# Patient Record
Sex: FEMALE | Race: WHITE | URBAN - METROPOLITAN AREA
[De-identification: names, ages, dates, MRNs, and addresses within clinical notes are randomized per-mention and may not be internally consistent; named-entity substitution may affect disease eponyms.]

---

## 2019-04-03 ENCOUNTER — Encounter (OUTPATIENT)
Dept: URBAN - METROPOLITAN AREA CLINIC 71 | Facility: CLINIC | Age: 74
End: 2019-04-03
Payer: MEDICARE

## 2019-04-03 PROCEDURE — 92014 COMPRE OPH EXAM EST PT 1/>: CPT | Performed by: OPHTHALMOLOGY

## 2019-04-03 PROCEDURE — 92134 CPTRZ OPH DX IMG PST SGM RTA: CPT | Performed by: OPHTHALMOLOGY

## 2019-04-25 ENCOUNTER — Encounter (OUTPATIENT)
Dept: URBAN - METROPOLITAN AREA CLINIC 71 | Facility: CLINIC | Age: 74
End: 2019-04-25
Payer: MEDICARE

## 2019-04-25 PROCEDURE — 92012 INTRM OPH EXAM EST PATIENT: CPT | Performed by: OPHTHALMOLOGY

## 2019-05-02 ENCOUNTER — SURGERY (OUTPATIENT)
Dept: URBAN - METROPOLITAN AREA SURGERY 44 | Facility: SURGERY | Age: 74
End: 2019-05-02
Payer: MEDICARE

## 2019-05-02 PROCEDURE — 66821 AFTER CATARACT LASER SURGERY: CPT | Performed by: OPHTHALMOLOGY

## 2019-05-09 ENCOUNTER — Encounter (OUTPATIENT)
Dept: URBAN - METROPOLITAN AREA CLINIC 71 | Facility: CLINIC | Age: 74
End: 2019-05-09

## 2019-05-09 PROCEDURE — 99024 POSTOP FOLLOW-UP VISIT: CPT | Performed by: OPTOMETRIST

## 2019-10-08 ENCOUNTER — Encounter (OUTPATIENT)
Dept: URBAN - METROPOLITAN AREA CLINIC 75 | Facility: CLINIC | Age: 74
End: 2019-10-08
Payer: COMMERCIAL

## 2019-10-08 PROCEDURE — 92014 COMPRE OPH EXAM EST PT 1/>: CPT | Performed by: OPTOMETRIST

## 2019-11-20 ENCOUNTER — Encounter (OUTPATIENT)
Dept: URBAN - METROPOLITAN AREA CLINIC 71 | Facility: CLINIC | Age: 74
End: 2019-11-20
Payer: MEDICARE

## 2019-11-20 PROCEDURE — 92014 COMPRE OPH EXAM EST PT 1/>: CPT | Performed by: OPHTHALMOLOGY

## 2019-11-20 PROCEDURE — 92134 CPTRZ OPH DX IMG PST SGM RTA: CPT | Performed by: OPHTHALMOLOGY

## 2020-05-21 ENCOUNTER — OFFICE VISIT (OUTPATIENT)
Dept: URBAN - METROPOLITAN AREA CLINIC 71 | Facility: CLINIC | Age: 75
End: 2020-05-21
Payer: MEDICARE

## 2020-05-21 DIAGNOSIS — Z96.1 PRESENCE OF INTRAOCULAR LENS: ICD-10-CM

## 2020-05-21 DIAGNOSIS — H35.362 DRUSEN (DEGENERATIVE) OF MACULA, LEFT EYE: Primary | ICD-10-CM

## 2020-05-21 PROCEDURE — 92014 COMPRE OPH EXAM EST PT 1/>: CPT | Performed by: OPHTHALMOLOGY

## 2020-05-21 PROCEDURE — 92134 CPTRZ OPH DX IMG PST SGM RTA: CPT | Performed by: OPHTHALMOLOGY

## 2020-05-21 ASSESSMENT — INTRAOCULAR PRESSURE
OS: 18
OD: 17

## 2020-05-21 NOTE — IMPRESSION/PLAN
Impression: Drusen (degenerative) of macula, left eye: H35.362. Plan: There are no specific modalities proven to be effective to treat drusen. A diet rich in green vegetables is recommended. It is important to follow the amsler grid and report any changes such as increase in distortion or blurring of lines. The drusen may increase with time and to try to decrease progression the recommendations above should be followed. If drusen progress they can cause decrease in vision or distortion of vision. Drusen may be an early sign of macular degeneration and this diagnosis may be possible in future. Avoidance of smoking or smoking cessation is recommended. Contact office if you experience decrease in vision, blurred vision, distortion in vision or concerning changes in vision. TAKE EYE VITAMINS AND CHECK AMSLER GRID DAILY.

## 2020-11-04 ENCOUNTER — OFFICE VISIT (OUTPATIENT)
Dept: URBAN - METROPOLITAN AREA CLINIC 75 | Facility: CLINIC | Age: 75
End: 2020-11-04
Payer: COMMERCIAL

## 2020-11-04 PROCEDURE — 92014 COMPRE OPH EXAM EST PT 1/>: CPT | Performed by: OPTOMETRIST

## 2020-11-04 ASSESSMENT — KERATOMETRY
OD: 44.00
OS: 43.88

## 2020-11-04 ASSESSMENT — INTRAOCULAR PRESSURE
OS: 14
OD: 13

## 2020-11-04 ASSESSMENT — VISUAL ACUITY
OS: 20/20
OD: 20/25

## 2020-11-04 NOTE — IMPRESSION/PLAN
Impression: Drusen (degenerative) of macula, left eye: H35.362 - hx of. appears stable.  Plan: Keep appts w/ Dr. Reginald Ramos to follow drusen OS

## 2021-05-19 ENCOUNTER — OFFICE VISIT (OUTPATIENT)
Dept: URBAN - METROPOLITAN AREA CLINIC 71 | Facility: CLINIC | Age: 76
End: 2021-05-19
Payer: MEDICARE

## 2021-05-19 DIAGNOSIS — H43.813 VITREOUS DEGENERATION, BILATERAL: ICD-10-CM

## 2021-05-19 DIAGNOSIS — H35.363 DRUSEN (DEGENERATIVE) OF MACULA, BILATERAL: ICD-10-CM

## 2021-05-19 PROCEDURE — 92014 COMPRE OPH EXAM EST PT 1/>: CPT | Performed by: OPHTHALMOLOGY

## 2021-05-19 PROCEDURE — 92134 CPTRZ OPH DX IMG PST SGM RTA: CPT | Performed by: OPHTHALMOLOGY

## 2021-05-19 ASSESSMENT — INTRAOCULAR PRESSURE
OD: 19
OS: 20
OS: 24
OD: 17

## 2021-05-19 NOTE — IMPRESSION/PLAN
Impression: Drusen (degenerative) of macula, bilateral: H35.363. Plan: dry type with stable vision. Will continue to monitor vision and the patient has been instructed to call with any vision changes. Recommend continuing the eye vitamins.

## 2021-05-19 NOTE — IMPRESSION/PLAN
Impression: Other secondary cataract, right eye: H26.491. Plan: PCO account for the patient's complaints. No treatment currently recommended. The patient will monitor vision changes and contact us with any decrease in vision.

## 2021-09-23 ENCOUNTER — OFFICE VISIT (OUTPATIENT)
Dept: URBAN - METROPOLITAN AREA CLINIC 71 | Facility: CLINIC | Age: 76
End: 2021-09-23
Payer: MEDICARE

## 2021-09-23 DIAGNOSIS — H26.491 OTHER SECONDARY CATARACT, RIGHT EYE: Primary | ICD-10-CM

## 2021-09-23 DIAGNOSIS — H40.053 OCULAR HYPERTENSION, BILATERAL: ICD-10-CM

## 2021-09-23 PROCEDURE — 99214 OFFICE O/P EST MOD 30 MIN: CPT | Performed by: OPHTHALMOLOGY

## 2021-09-23 ASSESSMENT — VISUAL ACUITY: OD: 20/20

## 2021-09-23 ASSESSMENT — INTRAOCULAR PRESSURE
OD: 23
OS: 23

## 2021-09-23 NOTE — IMPRESSION/PLAN
Impression: Ocular hypertension, bilateral: H40.053. High end of normal. Discussed adding drops for more of preventive. explained the drops vs the laser. Will start an eye drop if the IOP starts to peak over 25 Plan: Ocular hypertension, intraocular pressure too high, will start medication(s). Prescription(s) given to patient. Diagnosis explained and patient verbalized understanding.

## 2021-09-23 NOTE — IMPRESSION/PLAN
Impression: Other secondary cataract, right eye: H26.491. Plan: PCO account for the patient's complaints. Discussed all risks, benefits, procedures and recovery. Patient understands changing glasses will not improve vision. Patient desires to have surgery, order for surgery OD only.

## 2021-11-04 ENCOUNTER — PRE-OPERATIVE VISIT (OUTPATIENT)
Dept: URBAN - METROPOLITAN AREA CLINIC 71 | Facility: CLINIC | Age: 76
End: 2021-11-04
Payer: MEDICARE

## 2021-11-04 PROCEDURE — 92012 INTRM OPH EXAM EST PATIENT: CPT | Performed by: OPHTHALMOLOGY

## 2021-11-04 ASSESSMENT — VISUAL ACUITY: OD: 20/20

## 2021-11-04 ASSESSMENT — INTRAOCULAR PRESSURE
OD: 17
OS: 17

## 2021-11-04 NOTE — IMPRESSION/PLAN
Impression: Other secondary cataract, right eye: H26.491. Plan: OD: Discussed diagnosis in detail with patient. Discussed treatment options with patient. Discussed risks and benefits and patient understands. Surgical treatment is necessary to improve vision. Patient elects to have surgery. Pre op instructions given and understood. Post op instructions given and understood. Continue using current medication(s). PROCEED WITH YAG LASER CAPSULOTOMY OD ONLY. CS DONE.

## 2021-11-30 ENCOUNTER — SURGERY (OUTPATIENT)
Dept: URBAN - METROPOLITAN AREA SURGERY 44 | Facility: SURGERY | Age: 76
End: 2021-11-30
Payer: MEDICARE

## 2021-11-30 ENCOUNTER — SURGERY (OUTPATIENT)
Dept: URBAN - METROPOLITAN AREA SURGERY 45 | Facility: SURGERY | Age: 76
End: 2021-11-30
Payer: MEDICARE

## 2021-11-30 PROCEDURE — G8907 PT DOC NO EVENTS ON DISCHARG: HCPCS | Performed by: CLINIC/CENTER

## 2021-11-30 PROCEDURE — 66821 AFTER CATARACT LASER SURGERY: CPT | Performed by: OPHTHALMOLOGY

## 2021-11-30 PROCEDURE — G8918 PT W/O PREOP ORDER IV AB PRO: HCPCS | Performed by: CLINIC/CENTER

## 2021-12-08 ENCOUNTER — POST-OPERATIVE VISIT (OUTPATIENT)
Dept: URBAN - METROPOLITAN AREA CLINIC 71 | Facility: CLINIC | Age: 76
End: 2021-12-08
Payer: MEDICARE

## 2021-12-08 DIAGNOSIS — Z48.810 ENCOUNTER FOR SURGICAL AFTERCARE FOLLOWING SURGERY ON A SENSE ORGAN: Primary | ICD-10-CM

## 2021-12-08 PROCEDURE — 99024 POSTOP FOLLOW-UP VISIT: CPT | Performed by: OPHTHALMOLOGY

## 2021-12-08 ASSESSMENT — INTRAOCULAR PRESSURE
OS: 16
OD: 13

## 2021-12-08 NOTE — IMPRESSION/PLAN
Impression: S/P YAG Capsulotomy (Yttrium Aluminum [place]) OD - 8 Days. Encounter for surgical aftercare following surgery on a sense organ  Z48.810. Plan: Open capsule, vision has improved slightly. t stated that she is still experiencing a star burst effect. Discussed updating her glasses and prism.

## 2022-02-01 ENCOUNTER — OFFICE VISIT (OUTPATIENT)
Dept: URBAN - METROPOLITAN AREA CLINIC 75 | Facility: CLINIC | Age: 77
End: 2022-02-01
Payer: COMMERCIAL

## 2022-02-01 DIAGNOSIS — H52.4 PRESBYOPIA: Primary | ICD-10-CM

## 2022-02-01 PROCEDURE — 92014 COMPRE OPH EXAM EST PT 1/>: CPT | Performed by: OPTOMETRIST

## 2022-02-01 ASSESSMENT — INTRAOCULAR PRESSURE
OD: 15
OS: 18

## 2022-02-01 NOTE — IMPRESSION/PLAN
Impression: Drusen (degenerative) of macula, bilateral: H35.363. Clinical exam reveals evidence of mild drusen OU. Not enough to justify dx of AMD at this wes. Plan: Discussed drusen may be an early sign of macular degeneration and this diagnosis may be possible in future. There are no specific modalities proven to be effective to treat drusen. Avoidance of smoking or smoking cessation, a diet rich in green vegetables, and regular use of sunglasses with 100% ultraviolet light is recommended. Observe. Pt to contact office if he experiences decrease in vision, blurred vision, distortion in vision or concerning changes in vision.

## 2022-03-28 ENCOUNTER — OFFICE VISIT (OUTPATIENT)
Dept: URBAN - METROPOLITAN AREA CLINIC 75 | Facility: CLINIC | Age: 77
End: 2022-03-28
Payer: MEDICARE

## 2022-03-28 DIAGNOSIS — H40.013 OPEN ANGLE WITH BORDERLINE FINDINGS, LOW RISK, BILATERAL: ICD-10-CM

## 2022-03-28 PROCEDURE — 99214 OFFICE O/P EST MOD 30 MIN: CPT | Performed by: OPTOMETRIST

## 2022-03-28 PROCEDURE — 92134 CPTRZ OPH DX IMG PST SGM RTA: CPT | Performed by: OPTOMETRIST

## 2022-03-28 ASSESSMENT — INTRAOCULAR PRESSURE
OD: 16
OS: 16

## 2022-03-28 NOTE — IMPRESSION/PLAN
Impression: Open angle with borderline findings, low risk, bilateral: H40.013. Plan: Nasal step noted on Ganglion cell analysis OU today. Discussed possible correlation with glaucoma but due to normal C/D ratio OU and normotensive IOP OU. No treatment needed at this time.  Will continue to monitor

## 2022-03-28 NOTE — IMPRESSION/PLAN
Impression: Drusen (degenerative) of macula, left eye: H35.362. Plan: Clinical exam reveals evidence of mild drusen OU. Not enough to justify dx of AMD at this Time. Discussed drusen may be an early sign of macular degeneration and this diagnosis may be possible in future. There are no specific modalities proven to be effective to treat drusen. Avoidance of smoking or smoking cessation, a diet rich in green vegetables, and regular use of sunglasses with 100% ultraviolet light is recommended. Continue taking the eyes vitamins and Observe. Pt to contact office if he experiences decrease in vision, blurred vision, distortion in vision or concerning changes in vision.

## 2022-06-08 ENCOUNTER — OFFICE VISIT (OUTPATIENT)
Dept: URBAN - METROPOLITAN AREA CLINIC 71 | Facility: CLINIC | Age: 77
End: 2022-06-08
Payer: MEDICARE

## 2022-06-08 DIAGNOSIS — H43.813 VITREOUS DEGENERATION, BILATERAL: ICD-10-CM

## 2022-06-08 DIAGNOSIS — Z96.1 PRESENCE OF INTRAOCULAR LENS: ICD-10-CM

## 2022-06-08 DIAGNOSIS — H35.362 DRUSEN (DEGENERATIVE) OF MACULA, LEFT EYE: Primary | ICD-10-CM

## 2022-06-08 PROCEDURE — 99212 OFFICE O/P EST SF 10 MIN: CPT | Performed by: OPHTHALMOLOGY

## 2022-06-08 PROCEDURE — 92134 CPTRZ OPH DX IMG PST SGM RTA: CPT | Performed by: OPHTHALMOLOGY

## 2022-06-08 ASSESSMENT — INTRAOCULAR PRESSURE
OD: 17
OS: 12

## 2022-06-08 NOTE — IMPRESSION/PLAN
Impression: Drusen (degenerative) of macula, left eye: H35.362. Plan: OCT ordered- no sign of fluid or progression. Recommend continuing an eye vitamins as rx. Patient to return in 6 months for an OCT testing.

## 2022-12-08 ENCOUNTER — OFFICE VISIT (OUTPATIENT)
Dept: URBAN - METROPOLITAN AREA CLINIC 71 | Facility: CLINIC | Age: 77
End: 2022-12-08
Payer: MEDICARE

## 2022-12-08 DIAGNOSIS — H35.362 DRUSEN (DEGENERATIVE) OF MACULA, LEFT EYE: Primary | ICD-10-CM

## 2022-12-08 DIAGNOSIS — Z96.1 PRESENCE OF INTRAOCULAR LENS: ICD-10-CM

## 2022-12-08 DIAGNOSIS — H43.813 VITREOUS DEGENERATION, BILATERAL: ICD-10-CM

## 2022-12-08 DIAGNOSIS — H40.013 OPEN ANGLE WITH BORDERLINE FINDINGS, LOW RISK, BILATERAL: ICD-10-CM

## 2022-12-08 PROCEDURE — 92134 CPTRZ OPH DX IMG PST SGM RTA: CPT | Performed by: OPHTHALMOLOGY

## 2022-12-08 PROCEDURE — 99213 OFFICE O/P EST LOW 20 MIN: CPT | Performed by: OPHTHALMOLOGY

## 2022-12-08 ASSESSMENT — INTRAOCULAR PRESSURE
OD: 19
OD: 22
OS: 21

## 2022-12-08 NOTE — IMPRESSION/PLAN
Impression: Drusen (degenerative) of macula, left eye: H35.362. Plan: OCT ordered- no sign of fluid or progression. Recommend continuing an eye vitamins as rx.

## 2022-12-08 NOTE — IMPRESSION/PLAN
Impression: Open angle with borderline findings, low risk, bilateral: H40.013. Plan: OCT ordered- RNFL normal, IOP stable. Continue off drops at this time. Patient ot return in 6 months for VF testing.

## 2023-06-08 ENCOUNTER — OFFICE VISIT (OUTPATIENT)
Dept: URBAN - METROPOLITAN AREA CLINIC 71 | Facility: CLINIC | Age: 78
End: 2023-06-08
Payer: MEDICARE

## 2023-06-08 DIAGNOSIS — H43.813 VITREOUS DEGENERATION, BILATERAL: ICD-10-CM

## 2023-06-08 DIAGNOSIS — Z96.1 PRESENCE OF INTRAOCULAR LENS: ICD-10-CM

## 2023-06-08 DIAGNOSIS — H35.362 DRUSEN (DEGENERATIVE) OF MACULA, LEFT EYE: ICD-10-CM

## 2023-06-08 DIAGNOSIS — H40.053 OCULAR HYPERTENSION, BILATERAL: Primary | ICD-10-CM

## 2023-06-08 PROCEDURE — 99213 OFFICE O/P EST LOW 20 MIN: CPT | Performed by: OPHTHALMOLOGY

## 2023-06-08 PROCEDURE — 92083 EXTENDED VISUAL FIELD XM: CPT | Performed by: OPHTHALMOLOGY

## 2023-06-08 RX ORDER — LATANOPROST 50 UG/ML
0.005 % SOLUTION OPHTHALMIC
Qty: 2.5 | Refills: 0 | Status: INACTIVE
Start: 2023-06-08 | End: 2023-06-09

## 2023-06-08 ASSESSMENT — INTRAOCULAR PRESSURE
OS: 19
OD: 18

## 2023-06-08 NOTE — IMPRESSION/PLAN
Impression: Ocular hypertension, bilateral: H40.053. Plan: VF ordered, with NSC, IOP stable. Couldn't find previous VF to compared so we will repeat the VF to make sure that the [id number] defects are normal of if they have changed. Patient would like to be preventive, so we will start her on drops. Patient ot return in 1 months for drop check.

## 2023-06-08 NOTE — IMPRESSION/PLAN
Impression: Drusen (degenerative) of macula, left eye: H35.362. Plan: Recommend continuing an eye vitamins as rx.

## 2023-07-10 ENCOUNTER — OFFICE VISIT (OUTPATIENT)
Dept: URBAN - METROPOLITAN AREA CLINIC 71 | Facility: CLINIC | Age: 78
End: 2023-07-10
Payer: MEDICARE

## 2023-07-10 DIAGNOSIS — H43.813 VITREOUS DEGENERATION, BILATERAL: ICD-10-CM

## 2023-07-10 DIAGNOSIS — H40.053 OCULAR HYPERTENSION, BILATERAL: Primary | ICD-10-CM

## 2023-07-10 DIAGNOSIS — H35.362 DRUSEN (DEGENERATIVE) OF MACULA, LEFT EYE: ICD-10-CM

## 2023-07-10 DIAGNOSIS — Z96.1 PRESENCE OF INTRAOCULAR LENS: ICD-10-CM

## 2023-07-10 PROCEDURE — 99212 OFFICE O/P EST SF 10 MIN: CPT | Performed by: OPHTHALMOLOGY

## 2023-07-10 RX ORDER — LATANOPROST 50 UG/ML
0.005 % SOLUTION OPHTHALMIC
Qty: 7.5 | Refills: 2 | Status: ACTIVE
Start: 2023-07-10

## 2023-07-10 ASSESSMENT — INTRAOCULAR PRESSURE
OD: 16
OS: 19

## 2023-07-10 NOTE — IMPRESSION/PLAN
Impression: Ocular hypertension, bilateral: H40.053. Plan: IOP stable. Patient would like to be preventive, so we will start her on drops. Patient to return in 5 months for VF testing.

## 2023-10-12 ENCOUNTER — OFFICE VISIT (OUTPATIENT)
Dept: URBAN - METROPOLITAN AREA CLINIC 71 | Facility: CLINIC | Age: 78
End: 2023-10-12
Payer: MEDICARE

## 2023-10-12 DIAGNOSIS — H43.813 VITREOUS DEGENERATION, BILATERAL: ICD-10-CM

## 2023-10-12 DIAGNOSIS — Z96.1 PRESENCE OF INTRAOCULAR LENS: ICD-10-CM

## 2023-10-12 DIAGNOSIS — H40.053 OCULAR HYPERTENSION, BILATERAL: Primary | ICD-10-CM

## 2023-10-12 DIAGNOSIS — H35.362 DRUSEN (DEGENERATIVE) OF MACULA, LEFT EYE: ICD-10-CM

## 2023-10-12 PROCEDURE — 99213 OFFICE O/P EST LOW 20 MIN: CPT | Performed by: OPHTHALMOLOGY

## 2023-10-12 PROCEDURE — 92083 EXTENDED VISUAL FIELD XM: CPT | Performed by: OPHTHALMOLOGY

## 2023-10-12 ASSESSMENT — INTRAOCULAR PRESSURE
OS: 16
OD: 16

## 2024-05-24 ENCOUNTER — OFFICE VISIT (OUTPATIENT)
Dept: URBAN - METROPOLITAN AREA CLINIC 71 | Facility: CLINIC | Age: 79
End: 2024-05-24
Payer: COMMERCIAL

## 2024-05-24 DIAGNOSIS — Z96.1 PRESENCE OF INTRAOCULAR LENS: ICD-10-CM

## 2024-05-24 DIAGNOSIS — H53.2 DIPLOPIA: ICD-10-CM

## 2024-05-24 DIAGNOSIS — H35.363 DRUSEN (DEGENERATIVE) OF MACULA, BILATERAL: ICD-10-CM

## 2024-05-24 DIAGNOSIS — H52.4 PRESBYOPIA: Primary | ICD-10-CM

## 2024-05-24 PROCEDURE — 92012 INTRM OPH EXAM EST PATIENT: CPT

## 2024-05-24 ASSESSMENT — VISUAL ACUITY
OD: 20/20
OS: 20/20

## 2024-05-24 ASSESSMENT — INTRAOCULAR PRESSURE
OD: 10
OS: 17

## 2024-06-24 ENCOUNTER — OFFICE VISIT (OUTPATIENT)
Dept: URBAN - METROPOLITAN AREA CLINIC 71 | Facility: CLINIC | Age: 79
End: 2024-06-24
Payer: MEDICARE

## 2024-06-24 DIAGNOSIS — Z96.1 PRESENCE OF INTRAOCULAR LENS: ICD-10-CM

## 2024-06-24 DIAGNOSIS — H04.123 DRY EYE SYNDROME OF BILATERAL LACRIMAL GLANDS: Primary | ICD-10-CM

## 2024-06-24 DIAGNOSIS — H35.363 DRUSEN (DEGENERATIVE) OF MACULA, BILATERAL: ICD-10-CM

## 2024-06-24 PROCEDURE — 99214 OFFICE O/P EST MOD 30 MIN: CPT | Performed by: OPHTHALMOLOGY

## 2024-06-24 ASSESSMENT — INTRAOCULAR PRESSURE
OS: 16
OD: 15

## 2024-07-03 ENCOUNTER — OFFICE VISIT (OUTPATIENT)
Dept: URBAN - METROPOLITAN AREA CLINIC 71 | Facility: CLINIC | Age: 79
End: 2024-07-03
Payer: MEDICARE

## 2024-07-03 DIAGNOSIS — H04.123 DRY EYE SYNDROME OF BILATERAL LACRIMAL GLANDS: Primary | ICD-10-CM

## 2024-07-03 DIAGNOSIS — H35.362 DRUSEN (DEGENERATIVE) OF MACULA, LEFT EYE: ICD-10-CM

## 2024-07-03 DIAGNOSIS — H40.053 OCULAR HYPERTENSION, BILATERAL: ICD-10-CM

## 2024-07-03 DIAGNOSIS — Z96.1 PRESENCE OF INTRAOCULAR LENS: ICD-10-CM

## 2024-07-03 PROCEDURE — 99213 OFFICE O/P EST LOW 20 MIN: CPT | Performed by: OPHTHALMOLOGY

## 2024-07-03 RX ORDER — LATANOPROST 50 UG/ML
0.005 % SOLUTION OPHTHALMIC
Qty: 7.5 | Refills: 3 | Status: ACTIVE
Start: 2024-07-03

## 2024-07-03 ASSESSMENT — INTRAOCULAR PRESSURE
OD: 18
OS: 21

## 2024-07-08 ENCOUNTER — OFFICE VISIT (OUTPATIENT)
Dept: URBAN - METROPOLITAN AREA CLINIC 71 | Facility: CLINIC | Age: 79
End: 2024-07-08
Payer: MEDICARE

## 2024-07-08 DIAGNOSIS — H16.143 PUNCTATE KERATITIS, BILATERAL: Primary | ICD-10-CM

## 2024-07-08 PROCEDURE — 99213 OFFICE O/P EST LOW 20 MIN: CPT

## 2024-07-08 ASSESSMENT — INTRAOCULAR PRESSURE
OS: 19
OD: 14

## 2024-07-15 ENCOUNTER — OFFICE VISIT (OUTPATIENT)
Dept: URBAN - METROPOLITAN AREA CLINIC 71 | Facility: CLINIC | Age: 79
End: 2024-07-15
Payer: MEDICARE

## 2024-07-15 DIAGNOSIS — H16.143 PUNCTATE KERATITIS, BILATERAL: Primary | ICD-10-CM

## 2024-07-15 PROCEDURE — 99213 OFFICE O/P EST LOW 20 MIN: CPT

## 2024-07-15 ASSESSMENT — INTRAOCULAR PRESSURE
OS: 20
OD: 18

## 2024-07-23 ENCOUNTER — OFFICE VISIT (OUTPATIENT)
Dept: URBAN - METROPOLITAN AREA CLINIC 71 | Facility: CLINIC | Age: 79
End: 2024-07-23
Payer: MEDICARE

## 2024-07-23 DIAGNOSIS — H16.143 PUNCTATE KERATITIS, BILATERAL: Primary | ICD-10-CM

## 2024-07-23 PROCEDURE — 99213 OFFICE O/P EST LOW 20 MIN: CPT

## 2024-07-23 ASSESSMENT — INTRAOCULAR PRESSURE
OS: 20
OD: 15

## 2024-07-29 ENCOUNTER — OFFICE VISIT (OUTPATIENT)
Dept: URBAN - METROPOLITAN AREA CLINIC 71 | Facility: CLINIC | Age: 79
End: 2024-07-29
Payer: MEDICARE

## 2024-07-29 DIAGNOSIS — Z96.1 PRESENCE OF INTRAOCULAR LENS: ICD-10-CM

## 2024-07-29 DIAGNOSIS — H16.143 PUNCTATE KERATITIS, BILATERAL: Primary | ICD-10-CM

## 2024-07-29 PROCEDURE — 99212 OFFICE O/P EST SF 10 MIN: CPT | Performed by: OPHTHALMOLOGY

## 2024-07-29 ASSESSMENT — INTRAOCULAR PRESSURE
OD: 13
OS: 16

## 2024-10-21 ENCOUNTER — OFFICE VISIT (OUTPATIENT)
Dept: URBAN - METROPOLITAN AREA CLINIC 71 | Facility: CLINIC | Age: 79
End: 2024-10-21
Payer: MEDICARE

## 2024-10-21 DIAGNOSIS — H35.363 DRUSEN (DEGENERATIVE) OF MACULA, BILATERAL: ICD-10-CM

## 2024-10-21 DIAGNOSIS — H04.123 DRY EYE SYNDROME OF BILATERAL LACRIMAL GLANDS: ICD-10-CM

## 2024-10-21 DIAGNOSIS — H40.053 OCULAR HYPERTENSION, BILATERAL: Primary | ICD-10-CM

## 2024-10-21 DIAGNOSIS — H43.813 VITREOUS DEGENERATION, BILATERAL: ICD-10-CM

## 2024-10-21 DIAGNOSIS — Z96.1 PRESENCE OF INTRAOCULAR LENS: ICD-10-CM

## 2024-10-21 PROCEDURE — 92083 EXTENDED VISUAL FIELD XM: CPT | Performed by: OPHTHALMOLOGY

## 2024-10-21 PROCEDURE — 99213 OFFICE O/P EST LOW 20 MIN: CPT | Performed by: OPHTHALMOLOGY

## 2024-10-21 ASSESSMENT — INTRAOCULAR PRESSURE
OS: 15
OS: 17
OD: 15

## 2025-01-27 ENCOUNTER — OFFICE VISIT (OUTPATIENT)
Dept: URBAN - METROPOLITAN AREA CLINIC 71 | Facility: CLINIC | Age: 80
End: 2025-01-27
Payer: MEDICARE

## 2025-01-27 DIAGNOSIS — Z96.1 PRESENCE OF INTRAOCULAR LENS: ICD-10-CM

## 2025-01-27 DIAGNOSIS — H35.363 DRUSEN (DEGENERATIVE) OF MACULA, BILATERAL: ICD-10-CM

## 2025-01-27 DIAGNOSIS — H43.813 VITREOUS DEGENERATION, BILATERAL: ICD-10-CM

## 2025-01-27 DIAGNOSIS — H04.123 DRY EYE SYNDROME OF BILATERAL LACRIMAL GLANDS: ICD-10-CM

## 2025-01-27 DIAGNOSIS — H40.053 OCULAR HYPERTENSION, BILATERAL: Primary | ICD-10-CM

## 2025-01-27 PROCEDURE — 99213 OFFICE O/P EST LOW 20 MIN: CPT | Performed by: OPHTHALMOLOGY

## 2025-01-27 PROCEDURE — 92134 CPTRZ OPH DX IMG PST SGM RTA: CPT | Performed by: OPHTHALMOLOGY

## 2025-01-27 PROCEDURE — 92133 CPTRZD OPH DX IMG PST SGM ON: CPT | Performed by: OPHTHALMOLOGY

## 2025-01-27 ASSESSMENT — INTRAOCULAR PRESSURE
OD: 15
OS: 16

## 2025-06-25 ENCOUNTER — OFFICE VISIT (OUTPATIENT)
Dept: URBAN - METROPOLITAN AREA CLINIC 71 | Facility: CLINIC | Age: 80
End: 2025-06-25
Payer: MEDICARE

## 2025-06-25 DIAGNOSIS — H40.053 OCULAR HYPERTENSION, BILATERAL: Primary | ICD-10-CM

## 2025-06-25 DIAGNOSIS — H43.813 VITREOUS DEGENERATION, BILATERAL: ICD-10-CM

## 2025-06-25 DIAGNOSIS — H35.363 DRUSEN (DEGENERATIVE) OF MACULA, BILATERAL: ICD-10-CM

## 2025-06-25 DIAGNOSIS — Z96.1 PRESENCE OF INTRAOCULAR LENS: ICD-10-CM

## 2025-06-25 PROCEDURE — 99213 OFFICE O/P EST LOW 20 MIN: CPT | Performed by: OPHTHALMOLOGY

## 2025-06-25 PROCEDURE — 92083 EXTENDED VISUAL FIELD XM: CPT | Performed by: OPHTHALMOLOGY

## 2025-06-25 ASSESSMENT — INTRAOCULAR PRESSURE
OD: 17
OD: 15
OS: 15
OS: 17

## 2025-08-29 ENCOUNTER — OFFICE VISIT (OUTPATIENT)
Dept: URBAN - METROPOLITAN AREA CLINIC 71 | Facility: CLINIC | Age: 80
End: 2025-08-29
Payer: MEDICARE

## 2025-08-29 DIAGNOSIS — H00.014 HORDEOLUM EXTERNUM LEFT UPPER EYELID: Primary | ICD-10-CM

## 2025-08-29 PROCEDURE — 99214 OFFICE O/P EST MOD 30 MIN: CPT

## 2025-08-29 RX ORDER — NEOMYCIN, POLYMYXIN B SULFATES, DEXAMETHASONE 1; 3.5; 1 MG/G; MG/G; [USP'U]/G
OINTMENT OPHTHALMIC
Qty: 5 | Refills: 0 | Status: ACTIVE
Start: 2025-08-29

## 2025-08-29 ASSESSMENT — INTRAOCULAR PRESSURE
OD: 13
OS: 13